# Patient Record
Sex: MALE | Race: WHITE | NOT HISPANIC OR LATINO
[De-identification: names, ages, dates, MRNs, and addresses within clinical notes are randomized per-mention and may not be internally consistent; named-entity substitution may affect disease eponyms.]

---

## 2020-10-01 ENCOUNTER — RX ONLY (RX ONLY)
Age: 60
End: 2020-10-01

## 2023-11-21 ENCOUNTER — APPOINTMENT (OUTPATIENT)
Dept: URBAN - METROPOLITAN AREA SURGERY 17 | Age: 63
Setting detail: DERMATOLOGY
End: 2023-11-22

## 2023-11-21 VITALS
HEIGHT: 73.25 IN | RESPIRATION RATE: 12 BRPM | TEMPERATURE: 98 F | WEIGHT: 187 LBS | DIASTOLIC BLOOD PRESSURE: 76 MMHG | SYSTOLIC BLOOD PRESSURE: 138 MMHG | HEART RATE: 68 BPM

## 2023-11-21 DIAGNOSIS — Z85.828 PERSONAL HISTORY OF OTHER MALIGNANT NEOPLASM OF SKIN: ICD-10-CM

## 2023-11-21 DIAGNOSIS — Z80.8 FAMILY HISTORY OF MALIGNANT NEOPLASM OF OTHER ORGANS OR SYSTEMS: ICD-10-CM

## 2023-11-21 PROBLEM — C44.311 BASAL CELL CARCINOMA OF SKIN OF NOSE: Status: ACTIVE | Noted: 2023-11-21

## 2023-11-21 PROCEDURE — 17311 MOHS 1 STAGE H/N/HF/G: CPT

## 2023-11-21 PROCEDURE — OTHER MIPS QUALITY: OTHER

## 2023-11-21 PROCEDURE — 13151 CMPLX RPR E/N/E/L 1.1-2.5 CM: CPT

## 2023-11-21 PROCEDURE — OTHER CONSULTATION FOR MOHS SURGERY: OTHER

## 2023-11-21 PROCEDURE — OTHER MOHS SURGERY: OTHER

## 2023-11-21 PROCEDURE — OTHER COUNSELING: OTHER

## 2023-11-21 NOTE — PROCEDURE: MOHS SURGERY
Intermediate Repair Preamble Text (Leave Blank If You Do Not Want): Local anesthesia was obtained. The tissue surrounding the operative site was undermined with blunt scissor dissection. Hemostasis was obtained using electrocoagulation. The orientation of the closure was placed within the relaxed skin tension lines. The superficial fascia and subcutaneous layers were first closed with buried vertical mattress sutures. The epidermis was then approximated. Careful attention was given to eversion and even approximation of the edges. Standing tissue cones were removed as necessary by standard technique.

## 2023-11-21 NOTE — PROCEDURE: MOHS SURGERY
Complex Repair Preamble Text (Leave Blank If You Do Not Want): Local anesthesia was obtained. The tissue surrounding the operative site was undermined extensively with blunt scissor dissection to a distance greater than the maximum width of the defect measured perpendicular to the closure line. Hemostasis was obtained using electrocoagulation. The orientation of the closure was placed within the relaxed skin tension lines. The superficial fascia and subcutaneous layers were first closed using buried vertical mattress sutures. The epidermis was then approximated. Careful attention was given to eversion and even approximation of the edges. Standing tissue cones were removed as necessary by standard technique. \\n\\nExtensive undermining in this particular location and situation will allow a broader plate-like scar to optimize even scar contracture, and minimize scar spread and standing cone formation for the best cosmetic outcome. It will also reduce tension on the epidermal suture line and result in optimal healing of the surgical site.

## 2023-11-21 NOTE — PROCEDURE: MOHS SURGERY

## 2023-11-21 NOTE — PROCEDURE: CONSULTATION FOR MOHS SURGERY
Detail Level: Detailed
Size Of Lesion: 0.9
X Size Of Lesion In Cm (Optional): 0.5
Name Of The Referring Provider For Procedure: Dr. San
Incorporate Mauc In Note: Yes

## 2023-11-21 NOTE — PROCEDURE: MOHS SURGERY

## 2023-11-21 NOTE — PROCEDURE: MOHS SURGERY
Satisfactory Advancement Flap (Double) Text: A bilateral advancement flap was designed. Local anesthesia was obtained. The defect edges were debeveled with a #15 scalpel blade. The flaps were incised to the underlying adipose tissue. The flaps were then undermined and elevated. The tissue surrounding the operative site was undermined extensively with blunt scissor dissection. Hemostasis was obtained using electrocoagulation. The flaps were then advanced into the primary defect. Raised tissue cones were removed as necessary by standard technique. The superficial fascia and subcutaneous layers were closed with buried vertical mattress sutures. The epidermis was then approximated with nylon sutures. Careful attention was given to eversion and even approximation of the wound edges. A pressure dressing was applied.

## 2023-11-21 NOTE — PROCEDURE: MOHS SURGERY

## 2023-11-21 NOTE — PROCEDURE: COUNSELING
Detail Level: Detailed
Sunscreen Recommendations: Sunscreen therapy (SPF 30 or higher) was recommended to the patient due to their significantly higher risk of developing another skin cancer. They were counseled that this is up to 10X greater with 1 cancer and even higher with 2 or more. It was explained that failure to use sunscreen protection can result in an even higher risk of skin cancer due to increased genetic hits in sun-damaged skin cells. Sunscreen use by preventing more skin cancers can keep patients out of the higher risk group with 2 or more cancers. Physical blocking sunscreens that contain zinc/titanium are preferred over chemical sunscreens. Proper and adequate application and reapplication after exercise or bathing was also reviewed. The patient was also counseled on sun safety and other techniques for sun avoidance.
Nicotinamide Supplementation Recommendations: Nicotinamide 500mg BID was recommended to help reduce the risk of BCC & SCC by 23% and AK’s from 14-35%. The usage of this OTC form of vitamin B3 was reviewed with the patient due to their history of skin cancer. The medication must be taken continuously to maintain the benefit.
Detail Level: Generalized

## 2025-02-23 NOTE — PROCEDURE: MOHS SURGERY
no Post-care instructions were reviewed in detail. A handout was provided. Patient is not to engage in any heavy lifting, exercise, or swimming for the next 14 days. Should the patient develop any fevers, chills, bleeding, severe pain patient will contact the office immediately.\\n\\nA dose of both Tylenol 1000mg and ibuprofen 400mg (at the same time) was recommended shortly after surgery, and every 6 hours as needed for pain. Pain not adequately relieved or severe pain should be reported to our office or the provider on call. Avoid any additional aspirin containing products. Cell phone numbers of the providers were handed out. \\n\\nIce packs should also be used post surgically and may be placed over the wound dressing to help with pain and swelling, and bleeding. The ice pack is placed over the wound for fifteen minutes and may be repeated four to six times per day for 2-3 days.